# Patient Record
(demographics unavailable — no encounter records)

---

## 2025-02-11 NOTE — PROCEDURE
[FreeTextEntry1] : Right shoulder prepped with betadine; 1% lido applied to subQ tissue; 2cc 1% lido and 2cc Kenalog 40 (80 mg) injected into area of right subacromial bursa and in the vicinity of the supraspinatus tendon with good results and no complications.

## 2025-02-11 NOTE — PHYSICAL EXAM
[FreeTextEntry1] : There is continued mild swelling and joint line tenderness bilaterally without warmth and with a flexion deformity of bilateral knees. There is no other evidence of active synovitis throughout all other joints examined. There is pain on passive abduction of the right arm to about 70 degrees. There is pain on stressing the right rotator cuff - drop arm is negative. This reproduces patient's CC.

## 2025-02-11 NOTE — HISTORY OF PRESENT ILLNESS
[FreeTextEntry1] : Patient comes in for repeat right shoulder steroid injection. Has developed recurrence of right shoulder pain, especially during active abduction.

## 2025-02-11 NOTE — REVIEW OF SYSTEMS
[Fever] : no fever [Chills] : no chills [Eye Pain] : no eye pain [Red Eyes] : eyes not red [Shortness Of Breath] : no shortness of breath [Cough] : no cough [Abdominal Pain] : no abdominal pain [Diarrhea] : no diarrhea [Dysuria] : no dysuria [Skin Lesions] : no skin lesions [FreeTextEntry6] : No pleuritic C/P

## 2025-07-22 NOTE — PHYSICAL EXAM
[General Appearance - Alert] : alert [General Appearance - In No Acute Distress] : in no acute distress [General Appearance - Well Nourished] : well nourished [General Appearance - Well Developed] : well developed [] : no rash [Motor Exam] : the motor exam was normal [FreeTextEntry1] : There is continued mild swelling and joint line tenderness bilaterally without warmth and with a flexion deformity of bilateral knees. There is no other evidence of active synovitis throughout all other joints examined. There is pain on passive abduction of the right arm to about 70 degrees. There is pain on stressing the right rotator cuff - drop arm is negative. This reproduces patient's CC.

## 2025-07-22 NOTE — HISTORY OF PRESENT ILLNESS
[FreeTextEntry1] : Patient reports right shoulder steroid injection done at last visit was very effective but about two weeks ago symptoms recurred and would like another steroid injection. 
[FreeTextEntry1] : Patient reports right shoulder steroid injection done at last visit was very effective but about two weeks ago symptoms recurred and would like another steroid injection. 
No